# Patient Record
Sex: FEMALE | Race: OTHER | URBAN - METROPOLITAN AREA
[De-identification: names, ages, dates, MRNs, and addresses within clinical notes are randomized per-mention and may not be internally consistent; named-entity substitution may affect disease eponyms.]

---

## 2017-09-27 ENCOUNTER — INPATIENT (INPATIENT)
Facility: HOSPITAL | Age: 77
LOS: 0 days | Discharge: HOME HEALTH | End: 2017-09-28
Attending: INTERNAL MEDICINE | Admitting: INTERNAL MEDICINE
Payer: MEDICARE

## 2017-09-27 VITALS
TEMPERATURE: 98 F | HEIGHT: 67 IN | OXYGEN SATURATION: 100 % | WEIGHT: 169.98 LBS | RESPIRATION RATE: 16 BRPM | HEART RATE: 57 BPM | SYSTOLIC BLOOD PRESSURE: 132 MMHG | DIASTOLIC BLOOD PRESSURE: 74 MMHG

## 2017-09-27 DIAGNOSIS — N18.9 CHRONIC KIDNEY DISEASE, UNSPECIFIED: ICD-10-CM

## 2017-09-27 DIAGNOSIS — G45.9 TRANSIENT CEREBRAL ISCHEMIC ATTACK, UNSPECIFIED: ICD-10-CM

## 2017-09-27 DIAGNOSIS — I10 ESSENTIAL (PRIMARY) HYPERTENSION: ICD-10-CM

## 2017-09-27 DIAGNOSIS — E03.9 HYPOTHYROIDISM, UNSPECIFIED: ICD-10-CM

## 2017-09-27 DIAGNOSIS — E11.9 TYPE 2 DIABETES MELLITUS WITHOUT COMPLICATIONS: ICD-10-CM

## 2017-09-27 LAB
ALBUMIN SERPL ELPH-MCNC: 3.2 G/DL — LOW (ref 3.3–5)
ALP SERPL-CCNC: 78 U/L — SIGNIFICANT CHANGE UP (ref 40–120)
ALT FLD-CCNC: 18 U/L — SIGNIFICANT CHANGE UP (ref 12–78)
ANION GAP SERPL CALC-SCNC: 9 MMOL/L — SIGNIFICANT CHANGE UP (ref 5–17)
ANISOCYTOSIS BLD QL: SLIGHT — SIGNIFICANT CHANGE UP
APPEARANCE UR: CLEAR — SIGNIFICANT CHANGE UP
APTT BLD: 24.9 SEC — LOW (ref 27.5–37.4)
AST SERPL-CCNC: 12 U/L — LOW (ref 15–37)
BASOPHILS NFR BLD AUTO: 1 % — SIGNIFICANT CHANGE UP (ref 0–2)
BILIRUB SERPL-MCNC: 0.6 MG/DL — SIGNIFICANT CHANGE UP (ref 0.2–1.2)
BILIRUB UR-MCNC: NEGATIVE — SIGNIFICANT CHANGE UP
BLD GP AB SCN SERPL QL: SIGNIFICANT CHANGE UP
BUN SERPL-MCNC: 20 MG/DL — SIGNIFICANT CHANGE UP (ref 7–23)
CALCIUM SERPL-MCNC: 8.3 MG/DL — LOW (ref 8.5–10.1)
CHLORIDE SERPL-SCNC: 106 MMOL/L — SIGNIFICANT CHANGE UP (ref 96–108)
CK MB BLD-MCNC: 0.9 % — SIGNIFICANT CHANGE UP (ref 0–3.5)
CK MB CFR SERPL CALC: 0.7 NG/ML — SIGNIFICANT CHANGE UP (ref 0.5–3.6)
CK SERPL-CCNC: 82 U/L — SIGNIFICANT CHANGE UP (ref 26–192)
CO2 SERPL-SCNC: 23 MMOL/L — SIGNIFICANT CHANGE UP (ref 22–31)
COLOR SPEC: YELLOW — SIGNIFICANT CHANGE UP
CREAT SERPL-MCNC: 1.79 MG/DL — HIGH (ref 0.5–1.3)
DIFF PNL FLD: NEGATIVE — SIGNIFICANT CHANGE UP
EOSINOPHIL NFR BLD AUTO: 1 % — SIGNIFICANT CHANGE UP (ref 0–6)
GLUCOSE SERPL-MCNC: 153 MG/DL — HIGH (ref 70–99)
GLUCOSE UR QL: NEGATIVE MG/DL — SIGNIFICANT CHANGE UP
HCT VFR BLD CALC: 32.2 % — LOW (ref 34.5–45)
HGB BLD-MCNC: 10.8 G/DL — LOW (ref 11.5–15.5)
HYPOCHROMIA BLD QL: SLIGHT — SIGNIFICANT CHANGE UP
INR BLD: 1 RATIO — SIGNIFICANT CHANGE UP (ref 0.88–1.16)
KETONES UR-MCNC: NEGATIVE — SIGNIFICANT CHANGE UP
LEUKOCYTE ESTERASE UR-ACNC: NEGATIVE — SIGNIFICANT CHANGE UP
LYMPHOCYTES # BLD AUTO: 23 % — SIGNIFICANT CHANGE UP (ref 13–44)
MACROCYTES BLD QL: SLIGHT — SIGNIFICANT CHANGE UP
MCHC RBC-ENTMCNC: 31.1 PG — SIGNIFICANT CHANGE UP (ref 27–34)
MCHC RBC-ENTMCNC: 33.4 GM/DL — SIGNIFICANT CHANGE UP (ref 32–36)
MCV RBC AUTO: 93 FL — SIGNIFICANT CHANGE UP (ref 80–100)
MICROCYTES BLD QL: SLIGHT — SIGNIFICANT CHANGE UP
MONOCYTES NFR BLD AUTO: 6 % — SIGNIFICANT CHANGE UP (ref 2–14)
NEUTROPHILS NFR BLD AUTO: 67 % — SIGNIFICANT CHANGE UP (ref 43–77)
NITRITE UR-MCNC: NEGATIVE — SIGNIFICANT CHANGE UP
PH UR: 6.5 — SIGNIFICANT CHANGE UP (ref 5–8)
PLAT MORPH BLD: NORMAL — SIGNIFICANT CHANGE UP
PLATELET # BLD AUTO: 210 K/UL — SIGNIFICANT CHANGE UP (ref 150–400)
POLYCHROMASIA BLD QL SMEAR: SLIGHT — SIGNIFICANT CHANGE UP
POTASSIUM SERPL-MCNC: 4.6 MMOL/L — SIGNIFICANT CHANGE UP (ref 3.5–5.3)
POTASSIUM SERPL-SCNC: 4.6 MMOL/L — SIGNIFICANT CHANGE UP (ref 3.5–5.3)
PROT SERPL-MCNC: 7 GM/DL — SIGNIFICANT CHANGE UP (ref 6–8.3)
PROT UR-MCNC: NEGATIVE MG/DL — SIGNIFICANT CHANGE UP
PROTHROM AB SERPL-ACNC: 10.9 SEC — SIGNIFICANT CHANGE UP (ref 9.8–12.7)
RBC # BLD: 3.46 M/UL — LOW (ref 3.8–5.2)
RBC # FLD: 12.2 % — SIGNIFICANT CHANGE UP (ref 11–15)
RBC BLD AUTO: ABNORMAL
SODIUM SERPL-SCNC: 138 MMOL/L — SIGNIFICANT CHANGE UP (ref 135–145)
SP GR SPEC: 1.01 — SIGNIFICANT CHANGE UP (ref 1.01–1.02)
TROPONIN I SERPL-MCNC: <.015 NG/ML — SIGNIFICANT CHANGE UP (ref 0.01–0.04)
UROBILINOGEN FLD QL: NEGATIVE MG/DL — SIGNIFICANT CHANGE UP
VARIANT LYMPHS # BLD: 2 % — SIGNIFICANT CHANGE UP (ref 0–6)
WBC # BLD: 8 K/UL — SIGNIFICANT CHANGE UP (ref 3.8–10.5)
WBC # FLD AUTO: 8 K/UL — SIGNIFICANT CHANGE UP (ref 3.8–10.5)
WBC UR QL: SIGNIFICANT CHANGE UP

## 2017-09-27 PROCEDURE — 93880 EXTRACRANIAL BILAT STUDY: CPT | Mod: 26

## 2017-09-27 PROCEDURE — 99285 EMERGENCY DEPT VISIT HI MDM: CPT

## 2017-09-27 PROCEDURE — 99223 1ST HOSP IP/OBS HIGH 75: CPT

## 2017-09-27 PROCEDURE — 71010: CPT | Mod: 26

## 2017-09-27 PROCEDURE — 70450 CT HEAD/BRAIN W/O DYE: CPT | Mod: 26

## 2017-09-27 PROCEDURE — 93010 ELECTROCARDIOGRAM REPORT: CPT

## 2017-09-27 RX ORDER — ATORVASTATIN CALCIUM 80 MG/1
40 TABLET, FILM COATED ORAL AT BEDTIME
Qty: 0 | Refills: 0 | Status: DISCONTINUED | OUTPATIENT
Start: 2017-09-27 | End: 2017-09-28

## 2017-09-27 RX ORDER — DEXTROSE 50 % IN WATER 50 %
12.5 SYRINGE (ML) INTRAVENOUS ONCE
Qty: 0 | Refills: 0 | Status: DISCONTINUED | OUTPATIENT
Start: 2017-09-27 | End: 2017-09-28

## 2017-09-27 RX ORDER — ASPIRIN/CALCIUM CARB/MAGNESIUM 324 MG
325 TABLET ORAL ONCE
Qty: 0 | Refills: 0 | Status: COMPLETED | OUTPATIENT
Start: 2017-09-27 | End: 2017-09-27

## 2017-09-27 RX ORDER — DEXTROSE 50 % IN WATER 50 %
1 SYRINGE (ML) INTRAVENOUS ONCE
Qty: 0 | Refills: 0 | Status: DISCONTINUED | OUTPATIENT
Start: 2017-09-27 | End: 2017-09-28

## 2017-09-27 RX ORDER — FERROUS SULFATE 325(65) MG
325 TABLET ORAL DAILY
Qty: 0 | Refills: 0 | Status: DISCONTINUED | OUTPATIENT
Start: 2017-09-27 | End: 2017-09-28

## 2017-09-27 RX ORDER — INSULIN LISPRO 100/ML
VIAL (ML) SUBCUTANEOUS
Qty: 0 | Refills: 0 | Status: DISCONTINUED | OUTPATIENT
Start: 2017-09-27 | End: 2017-09-28

## 2017-09-27 RX ORDER — LEVOTHYROXINE SODIUM 125 MCG
100 TABLET ORAL DAILY
Qty: 0 | Refills: 0 | Status: DISCONTINUED | OUTPATIENT
Start: 2017-09-27 | End: 2017-09-28

## 2017-09-27 RX ORDER — HEPARIN SODIUM 5000 [USP'U]/ML
5000 INJECTION INTRAVENOUS; SUBCUTANEOUS EVERY 8 HOURS
Qty: 0 | Refills: 0 | Status: DISCONTINUED | OUTPATIENT
Start: 2017-09-27 | End: 2017-09-28

## 2017-09-27 RX ORDER — SODIUM CHLORIDE 9 MG/ML
1000 INJECTION, SOLUTION INTRAVENOUS
Qty: 0 | Refills: 0 | Status: DISCONTINUED | OUTPATIENT
Start: 2017-09-27 | End: 2017-09-28

## 2017-09-27 RX ORDER — ASPIRIN/CALCIUM CARB/MAGNESIUM 324 MG
325 TABLET ORAL DAILY
Qty: 0 | Refills: 0 | Status: DISCONTINUED | OUTPATIENT
Start: 2017-09-28 | End: 2017-09-28

## 2017-09-27 RX ORDER — GLUCAGON INJECTION, SOLUTION 0.5 MG/.1ML
1 INJECTION, SOLUTION SUBCUTANEOUS ONCE
Qty: 0 | Refills: 0 | Status: DISCONTINUED | OUTPATIENT
Start: 2017-09-27 | End: 2017-09-28

## 2017-09-27 RX ORDER — AMLODIPINE BESYLATE 2.5 MG/1
10 TABLET ORAL DAILY
Qty: 0 | Refills: 0 | Status: DISCONTINUED | OUTPATIENT
Start: 2017-09-27 | End: 2017-09-28

## 2017-09-27 RX ADMIN — Medication 325 MILLIGRAM(S): at 12:13

## 2017-09-27 RX ADMIN — ATORVASTATIN CALCIUM 40 MILLIGRAM(S): 80 TABLET, FILM COATED ORAL at 21:55

## 2017-09-27 RX ADMIN — HEPARIN SODIUM 5000 UNIT(S): 5000 INJECTION INTRAVENOUS; SUBCUTANEOUS at 21:54

## 2017-09-27 NOTE — H&P ADULT - HISTORY OF PRESENT ILLNESS
77yo F pmhx htn, dm(diet controlled), hypothyroid, ckd, hx of tia. Presenting after syncopal episode witnessed by hha. Pt got up to get coffee was noticed to be tremulous in the left hand, sat down and passed out in the chair. Was noted to be slurring her words, with l sided facial droop and l sided weakness. BP was noted to be 95/50. Episode occurred at 9am, last seen normal was 830am. When ems arrived patients symptoms completely resolved and she was able to walk to the ambulance.  In the ED patient with resolution of all symptoms and back to baseline as per family/patient. Denies headache, dizziness, cp, palpitations

## 2017-09-27 NOTE — CONSULT NOTE ADULT - SUBJECTIVE AND OBJECTIVE BOX
Historian:       Consult requested by ER doctor:                  Attending:     HPI:   TITO KNIGHT. 77yo F pmhx htn, dm(diet controlled), hypothyroid, ckd, hx of tia. Presenting after syncopal episode witnessed by hha. Pt got up to get coffee was noticed to be tremulous in the left hand, sat down and passed out in the chair. Was noted to be slurring her words, with l sided facial droop and l sided weakness. BP was noted to be 95/50. Episode occurred at 9am, last seen normal was 830am. When ems arrived patients symptoms completely resolved and she was able to walk to the ambulance.  In the ED patient with resolution of all symptoms and back to baseline as per family/patient. Denies headache, dizziness, cp, palpitations (27 Sep 2017 12:49)      PAST MEDICAL & SURGICAL HISTORY:  TIA (transient ischemic attack)  Diabetes  Hypertension  No significant past surgical history  76yFemale    MEDICATIONS  (STANDING):  heparin  Injectable 5000 Unit(s) SubCutaneous every 8 hours  atorvastatin 40 milliGRAM(s) Oral at bedtime  insulin lispro (HumaLOG) corrective regimen sliding scale   SubCutaneous three times a day before meals  dextrose 5%. 1000 milliLiter(s) (50 mL/Hr) IV Continuous <Continuous>  dextrose 50% Injectable 12.5 Gram(s) IV Push once  enalapril 20 milliGRAM(s) Oral daily  amLODIPine   Tablet 10 milliGRAM(s) Oral daily  levothyroxine 100 MICROGram(s) Oral daily  ferrous    sulfate 325 milliGRAM(s) Oral daily    MEDICATIONS  (PRN):  dextrose Gel 1 Dose(s) Oral once PRN Blood Glucose LESS THAN 70 milliGRAM(s)/deciliter  glucagon  Injectable 1 milliGRAM(s) IntraMuscular once PRN Glucose LESS THAN 70 milligrams/deciliter      Allergies    No Known Allergies    Intolerances      FAMILY HISTORY:  No pertinent family history in first degree relatives    Vital Signs Last 24 Hrs  T(C): 37.1 (27 Sep 2017 15:36), Max: 37.2 (27 Sep 2017 12:59)  T(F): 98.8 (27 Sep 2017 15:36), Max: 98.9 (27 Sep 2017 12:59)  HR: 60 (27 Sep 2017 15:36) (57 - 60)  BP: 147/71 (27 Sep 2017 15:36) (132/74 - 147/71)  BP(mean): --  RR: 17 (27 Sep 2017 15:36) (11 - 17)  SpO2: 100% (27 Sep 2017 15:36) (98% - 100%)    NEUROLOGICAL EXAM:  HENT:  Normocephalic head; atraumatic head.  Neck supple.  ENT: normal looking.  Mental State:    Alert.  Fully oriented to person, place and date.  Coherent.  Speech clear and intact.  Cooperative.  Responds appropriately.    Cranial Nerves:  II-XII:   Pupils round and reactive to light and accommodation.  Extraocular movements full.  Visual fields full (no homonymous hemianopsia).  Visual acuity wnl.  Facial symmetry intact.  Tongue midline.  Motor Functions:  Moves all extremities.  No pronator drift of UE.  Claps hand well.  Hand  intact bilaterally.  Ambulatory.    Sensory Functions:   Intact to touch and pinprick to face and extremities.    Reflexes:  Deep tendon reflexes normoactive to biceps, knees and ankles.  Babinski absent (present).  Cerebellar Testing:    Finger to nose intact.  Nystagmus absent.  Neurovascular: Carotid auscultation full without bruits.      LABS:                        10.8   8.0   )-----------( 210      ( 27 Sep 2017 11:24 )             32.2         138  |  106  |  20  ----------------------------<  153<H>  4.6   |  23  |  1.79<H>    Ca    8.3<L>      27 Sep 2017 11:24    TPro  7.0  /  Alb  3.2<L>  /  TBili  0.6  /  DBili  x   /  AST  12<L>  /  ALT  18  /  AlkPhos  78      PT/INR - ( 27 Sep 2017 11:24 )   PT: 10.9 sec;   INR: 1.00 ratio         PTT - ( 27 Sep 2017 11:24 )  PTT:24.9 sec    Urinalysis Basic - ( 27 Sep 2017 12:57 )    Color: Yellow / Appearance: Clear / S.010 / pH: x  Gluc: x / Ketone: Negative  / Bili: Negative / Urobili: Negative mg/dL   Blood: x / Protein: Negative mg/dL / Nitrite: Negative   Leuk Esterase: Negative / RBC: x / WBC 0-2   Sq Epi: x / Non Sq Epi: x / Bacteria: x        RADIOLOGY & ADDITIONAL STUDIES:    CT Head No Cont: Urgent   Indication: left hand arm weakness syncope 9:00 am  Transport: Stretcher-Crib  Exam Completed  Provider's Contact #: [phone] ( @ 11:00)  12 Lead ECG:   Provider's Contact #: [phone] ( @ 11:12)  Blood Glucose Point Of Care Testing:     Frequency:  Once ( @ 11:12)  Xray Chest 1 View AP/PA.: Urgent   Indication: left side weakness syncope  Transport: Stretcher-Crib  Exam Completed  Provider's Contact #: [phone] ( @ 11:12)  Complete Blood Count + Automated Diff: STAT ( @ 11:12)  Comprehensive Metabolic Panel: STAT ( @ 11:12)  Prothrombin Time and INR, Plasma:  Start Date:27-Sep-2017. STAT ( 11:12)  Activated Partial Thromboplastin Time:  Start Date:27-Sep-2017. STAT ( @ 11:12)  Type + Screen: Routine ( @ 11:12)  Creatine Kinase, Serum: STAT ( @ 11:12)  CKMB Mass Assay: STAT ( 11:12)  Troponin I, Serum: STAT ( @ 11:12)  Urinalysis + Microscopic Examination: STAT ( @ 11:12)  Culture - Urine: Routine  Specimen Source: Clean Catch (Midstream) ( @ 11:12)  Cardiac Monitor Bedside:     Time/Priority:  STAT ( @ 11:12)  aspirin:   325 milliGRAM(s), Oral, Once, Stop After 1 Doses  Provider's Contact #: [phone] ( @ 11:24)  Antibody Screen: 11:21 ( @ 11:25)  Manual Differential: 11:21 ( @ 11:30)  Admit to Inpatient Level of Care:     Service:  TELEMETRY    Physician:  Parveen Smallwood    Additional Instructions:  Diagnosis: TIA (transient ischemic attack)  Isolation: None  Special Consideration: None ( @ 12:08)  (Floorstock):   Qty Removed: 1 each ( @ 12:11)  Admit to Inpatient Level of Care:     Service:  Telemetry    Physician:  kerwin ( @ 12:15)  National Institutes of Health Stroke Scale Score:     Time/Priority:  Routine    Additional Instructions:  Type score here ___________ ( @ 12:15)  heparin  Injectable:   5000 Unit(s), SubCutaneous, every 8 hours  Provider's Contact #: [phone] ( @ 12:15)  Dysphagia Screening:     Time/Priority:  Routine ( @ 12:15)  Supervise Meals:    Indications:    Dysphagia   Additional Instructions:  Once dysphagia screen passed, contact provider to reevaluate diet order ( @ 12:15)  Assess Neurological Status:     Type of Neuro Check:  Stroke    Additional Instructions:  Perform stroke neurological check (:15)  Notify Provider For:     Additional Instructions:  Temperature GREATER THAN 38.3C (101.0F) or LESS THAN 36.0C (96.8F) ( 12:15)  Notify Provider For:     Additional Instructions:  Decline or change in neurological status (:15)  Elevate - Head of Bed 30 Degrees:     Frequency:  <Continuous> ( @ 12:15)  Activity - Out of Bed with Assistance ( 12:15)  Fall Risk Protocol:     Time/Priority:  Routine    Additional Instructions:  Follow fall risk protocol ( 12:15)  Aspiration Precautions:     Time/Priority:  Routine ( @ 12:15)  Remote Telemetry/EKG EXCEPT Off Unit Tests:     Time/Priority:  Routine ( @ 12:15)  Education:     Other: Stroke    Additional Instructions: Distribute Stroke Education material and provide stroke education ( @ 12:15)  atorvastatin: [Known as LIPITOR]  40 milliGRAM(s), Oral, at bedtime  Provider's Contact #: [phone] ( @ 12:15)  aspirin enteric coated: [Known as Ecotrin]  325 milliGRAM(s), Oral, daily  Provider's Contact #: [phone] ( @ 12:15)  Hemoglobin A1C, Whole Blood: AM Sched. Collection: 28-Sep-2017 05:00 ( @ 12:15)  Blood Glucose Point Of Care Testing:     Frequency:  Before meals and at bedtime    Additional Instructions:  Before meals and at bedtime (09-27 @ 12:15)  Blood Glucose Point Of Care Testing:     Frequency:  every 15 minutes    Additional Instructions:  After carbohydrate administration for hypoglycemia, repeat every 15 minute(s) until blood glucose is GREATER THAN or EQUAL  milliGRAM(s)/deciLiter twice consecutively ( @ 12:15)  Notify Provider For:     Additional Instructions:  Blood glucose LESS THAN 70 milliGRAM(s)/deciLiter or GREATER THAN 400 milliGRAM(s)/deciLiter ( @ 12:15)  Education:     Diabetes    Other: Diet, Exercise    Additional Instructions: Diet, Exercise, Diabetes ( @ 12:15)  insulin lispro (HumaLOG) corrective regimen sliding scale:       2 Unit(s) if Glucose 151 - 200      4 Unit(s) if Glucose 201 - 250      6 Unit(s) if Glucose 251 - 300      8 Unit(s) if Glucose 301 - 350      10 Unit(s) if Glucose 351 - 400      12 Unit(s) if Glucose Greater Than 400 + Contact MD  SubCutaneous, three times a day before meals  Special Instructions: Give correctional scale insulin REGARDLESS of PO status NOTIFY Provider for blood glucose LESS THAN 70 milliGRAM(s)/deciLiter or above 400 milliGRAM(s)/deciLiter.  Administration Instructions: *Per Sliding Scale*  Dispose unused medication in BLACK bin.  This is a Look-alike/Sound-alike Medication  Provider's Contact #: [phone] ( @ 12:15)  dextrose 5%.: Solution, 1000 milliLiter(s) infuse at 50 mL/Hr  Special Instructions: Conditional Order: HYPOGLYCEMIA PROTOCOL  Provider's Contact #: [phone] ( @ 12:15)  Administer Carbohydrates:     Additional Instructions:  HYPOGLYCEMIA PROTOCOL ( 12:15)  dextrose Gel: [Known as GLUTOSE]  1 Dose(s), Oral, once, PRN for Blood Glucose LESS THAN 70 milliGRAM(s)/deciliter, Stop After 1 Doses  Special Instructions: Conditional Order: HYPOGLYCEMIA PROTOCOL  Provider's Contact #: [phone] ( @ 12:15)  dextrose 50% Injectable:   12.5 Gram(s), IV Push, once, Stop After 1 Doses  Special Instructions: Conditional Order: HYPOGLYCEMIA PROTOCOL  Provider's Contact #: [phone] ( @ 12:15)  glucagon  Injectable:   1 milliGRAM(s), IntraMuscular, once, PRN for Glucose LESS THAN 70 milligrams/deciliter, Stop After 1 Doses  Special Instructions: Conditional Order: HYPOGLYCEMIA PROTOCOL  Provider's Contact #: [phone] ( @ 12:15)  Provider to RN:       UNRESPONSIVE patient and Blood Glucose LESS THAN 70 milliGRAM(s)/deciLiter call Rapid Response.  HYPOGLYCEMIA PROTOCOL ( @ 12:15)  Lipid Profile: AM Sched. Collection: 28-Sep-2017 05:00  Addl Info: Fasting ( @ 12:15)  Consult- PT Evaluate and Treat:   *Reason for Consult - Must select at least one choice*     Neuro Event  Weight Bearing Restrictions: No ( @ 12:15)  Consult- Social Work, Adult ( @ 12:15)  TTE Echo Doppler w/o Cont:   Transport: Stretcher-Crib  Monitor: w/o Monitor  Exam Completed  Provider's Contact #: [phone] ( @ 12:16)  US Duplex Carotid Arteries Complete, Bilateral: Routine   Indication: syncope  Transport: Stretcher-Crib  Exam Completed  Provider's Contact #: [phone] ( @ 12:16)  Admit from ED ( @ 12:20)  Thyroid Stimulating Hormone, Serum: AM Sched. Collection: 28-Sep-2017 05:00 ( @ 12:20)  Consult- Speech Bedside Swallow Evaluation:   *Reason for Consult - Must select at least one choice*     NPO Until Further Recommendations Made ( @ 12:23)  Diet, Consistent Carbohydrate/No Snacks ( @ 12:49)  enalapril: [Known as VASOTEC]  20 milliGRAM(s), Oral, daily  Provider's Contact #: [phone] ( @ 12:55)  amLODIPine   Tablet: [Known as NORVASC]  10 milliGRAM(s), Oral, daily  Administration Instructions: This is a Look-alike/Sound-alike Medication  Provider's Contact #: [phone] ( @ 12:55)  levothyroxine: [Known as SYNTHROID]  100 MICROGram(s), Oral, daily  Provider's Contact #: [phone] ( @ 12:55)  ferrous    sulfate:   325 milliGRAM(s), Oral, daily  Administration Instructions: Contains 65mG elemental iron  Provider's Contact #: [phone] ( @ 13:01)  Consult- Registered Dietitian:    Reason for Consult: Nutrition Risk Screen    do you have any questions about your prescribed diet? ( @ 16:01)      Assessment & Opinion:    Recommendations:  Brain MRI.  Carotid doppler.  Echocardiogram.  EEG.   DVT prophylaxis as ordered.  Medications: Historian:     Patient and daughter.  ER notes also reviewed.  HPI:   TITO KNIGHT. 77yo F pmhx htn, dm(diet controlled), hypothyroid, ckd, hx of tia. Presenting after syncopal episode witnessed by hha. Pt got up to get coffee was noticed to be tremulous in the left hand, sat down and passed out in the chair. Was noted to be slurring her words, with l sided facial droop and l sided weakness. BP was noted to be 95/50. Episode occurred at 9am, last seen normal was 830am. When ems arrived patients symptoms completely resolved and she was able to walk to the ambulance.  In the ED patient with resolution of all symptoms and back to baseline as per family/patient. Denies headache, dizziness, cp, palpitations (27 Sep 2017 12:49)    PAST MEDICAL & SURGICAL HISTORY:  TIA (transient ischemic attack); Diabetes; Hypertension; No significant past surgical history    MEDICATIONS  (STANDING):  heparin  Injectable 5000 Unit(s) SubCutaneous every 8 hours  atorvastatin 40 milliGRAM(s) Oral at bedtime  insulin lispro (HumaLOG) corrective regimen sliding scale   SubCutaneous three times a day before meals  dextrose 5%. 1000 milliLiter(s) (50 mL/Hr) IV Continuous <Continuous>  dextrose 50% Injectable 12.5 Gram(s) IV Push once  enalapril 20 milliGRAM(s) Oral daily  amLODIPine   Tablet 10 milliGRAM(s) Oral daily  levothyroxine 100 MICROGram(s) Oral daily  ferrous    sulfate 325 milliGRAM(s) Oral daily    MEDICATIONS  (PRN):  dextrose Gel 1 Dose(s) Oral once PRN Blood Glucose LESS THAN 70 milliGRAM(s)/deciliter  glucagon  Injectable 1 milliGRAM(s) IntraMuscular once PRN Glucose LESS THAN 70 milligrams/deciliter    Allergies: No Known Allergies  Intolerances  FAMILY HISTORY:  No pertinent family history in first degree relatives    Vital Signs Last 24 Hrs  T(C): 37.1 (27 Sep 2017 15:36), Max: 37.2 (27 Sep 2017 12:59)  T(F): 98.8 (27 Sep 2017 15:36), Max: 98.9 (27 Sep 2017 12:59)  HR: 60 (27 Sep 2017 15:36) (57 - 60)  BP: 147/71 (27 Sep 2017 15:36) (132/74 - 147/71)  BP(mean): --  RR: 17 (27 Sep 2017 15:36) (11 - 17)  SpO2: 100% (27 Sep 2017 15:36) (98% - 100%)    NEUROLOGICAL EXAM:  HENT:  Normocephalic head; atraumatic head.  Neck supple.  ENT: normal looking.  Mental State:    Alert.  Fully oriented to person, place and date.  Coherent.  Speech clear and intact.  Cooperative.  Responds appropriately.    Cranial Nerves:  II-XII:   Pupils round and reactive to light and accommodation.  Extraocular movements full.  Visual fields full (no homonymous hemianopsia).  Visual acuity wnl.  Facial symmetry intact.  Tongue midline.  Motor Functions:  Moves all extremities.  No pronator drift of UE.  Claps hands well.  Hand  intact bilaterally.    Sensory Functions:   Intact to touch and pinprick to face and extremities.    Reflexes:  Deep tendon reflexes normoactive to knees and ankles.  Cerebellar Testing:    Finger to nose intact.  Nystagmus absent.  Neurovascular: Carotid auscultation full without bruits.      LABS:                        10.8   8.0   )-----------( 210      ( 27 Sep 2017 11:24 )             32.2         138  |  106  |  20  ----------------------------<  153<H>  4.6   |  23  |  1.79<H>    Ca    8.3<L>      27 Sep 2017 11:24    TPro  7.0  /  Alb  3.2<L>  /  TBili  0.6  /  DBili  x   /  AST  12<L>  /  ALT  18  /  AlkPhos  78      PT/INR - ( 27 Sep 2017 11:24 )   PT: 10.9 sec;   INR: 1.00 ratio         PTT - ( 27 Sep 2017 11:24 )  PTT:24.9 sec    Urinalysis Basic - ( 27 Sep 2017 12:57 )    Color: Yellow / Appearance: Clear / S.010 / pH: x  Gluc: x / Ketone: Negative  / Bili: Negative / Urobili: Negative mg/dL   Blood: x / Protein: Negative mg/dL / Nitrite: Negative   Leuk Esterase: Negative / RBC: x / WBC 0-2   Sq Epi: x / Non Sq Epi: x / Bacteria: x        RADIOLOGY & ADDITIONAL STUDIES:    CT Head No Cont: Urgent   Indication: left hand arm weakness syncope 9:00 am  Transport: Stretcher-Crib  Exam Completed  Provider's Contact #: [phone] ( @ 11:00)  12 Lead ECG:   Provider's Contact #: [phone] ( @ 11:12)  Blood Glucose Point Of Care Testing:     Frequency:  Once ( @ 11:12)  Xray Chest 1 View AP/PA.: Urgent   Indication: left side weakness syncope  Transport: Stretcher-Crib  Exam Completed  Provider's Contact #: [phone] ( @ 11:12)  Complete Blood Count + Automated Diff: STAT ( @ 11:12)  Comprehensive Metabolic Panel: STAT ( 11:12)  Prothrombin Time and INR, Plasma:  Start Date:27-Sep-2017. STAT ( 11:12)  Activated Partial Thromboplastin Time:  Start Date:27-Sep-2017. STAT ( @ 11:12)  Type + Screen: Routine ( @ 11:12)  Creatine Kinase, Serum: STAT ( @ 11:12)  CKMB Mass Assay: STAT ( 11:12)  Troponin I, Serum: STAT ( @ 11:12)  Urinalysis + Microscopic Examination: STAT ( @ 11:12)  Culture - Urine: Routine  Specimen Source: Clean Catch (Midstream) ( @ 11:12)  Cardiac Monitor Bedside:     Time/Priority:  STAT ( @ 11:12)  aspirin:   325 milliGRAM(s), Oral, Once, Stop After 1 Doses  Provider's Contact #: [phone] ( @ 11:24)  Antibody Screen: 11:21 ( @ 11:25)  Manual Differential: 11:21 ( @ 11:30)  Admit to Inpatient Level of Care:     Service:  TELEMETRY    Physician:  Parveen Smallwood    Additional Instructions:  Diagnosis: TIA (transient ischemic attack)  Isolation: None  Special Consideration: None ( @ 12:08)  (Floorstock):   Qty Removed: 1 each ( @ 12:11)  Admit to Inpatient Level of Care:     Service:  Telemetry    Physician:  kerwin ( 12:15)  National Institutes of Health Stroke Scale Score:     Time/Priority:  Routine    Additional Instructions:  Type score here ___________ ( @ 12:15)  heparin  Injectable:   5000 Unit(s), SubCutaneous, every 8 hours  Provider's Contact #: [phone] ( @ 12:15)  Dysphagia Screening:     Time/Priority:  Routine ( @ 12:15)  Supervise Meals:    Indications:    Dysphagia   Additional Instructions:  Once dysphagia screen passed, contact provider to reevaluate diet order ( 12:15)  Assess Neurological Status:     Type of Neuro Check:  Stroke    Additional Instructions:  Perform stroke neurological check (:15)  Notify Provider For:     Additional Instructions:  Temperature GREATER THAN 38.3C (101.0F) or LESS THAN 36.0C (96.8F) ( @ 12:15)  Notify Provider For:     Additional Instructions:  Decline or change in neurological status (:15)  Elevate - Head of Bed 30 Degrees:     Frequency:  <Continuous> ( @ :15)  Activity - Out of Bed with Assistance (:15)  Fall Risk Protocol:     Time/Priority:  Routine    Additional Instructions:  Follow fall risk protocol (:15)  Aspiration Precautions:     Time/Priority:  Routine ( @ 12:15)  Remote Telemetry/EKG EXCEPT Off Unit Tests:     Time/Priority:  Routine ( @ 12:15)  Education:     Other: Stroke    Additional Instructions: Distribute Stroke Education material and provide stroke education (:15)  atorvastatin: [Known as LIPITOR]  40 milliGRAM(s), Oral, at bedtime  Provider's Contact #: [phone] ( @ 12:15)  aspirin enteric coated: [Known as Ecotrin]  325 milliGRAM(s), Oral, daily  Provider's Contact #: [phone] ( @ 12:15)  Hemoglobin A1C, Whole Blood: AM Sched. Collection: 28-Sep-2017 05:00 ( @ 12:15)  Blood Glucose Point Of Care Testing:     Frequency:  Before meals and at bedtime    Additional Instructions:  Before meals and at bedtime ( @ 12:15)  Blood Glucose Point Of Care Testing:     Frequency:  every 15 minutes    Additional Instructions:  After carbohydrate administration for hypoglycemia, repeat every 15 minute(s) until blood glucose is GREATER THAN or EQUAL  milliGRAM(s)/deciLiter twice consecutively ( @ 12:15)  Notify Provider For:     Additional Instructions:  Blood glucose LESS THAN 70 milliGRAM(s)/deciLiter or GREATER THAN 400 milliGRAM(s)/deciLiter ( @ 12:15)  Education:     Diabetes    Other: Diet, Exercise    Additional Instructions: Diet, Exercise, Diabetes ( @ 12:15)  insulin lispro (HumaLOG) corrective regimen sliding scale:       2 Unit(s) if Glucose 151 - 200      4 Unit(s) if Glucose 201 - 250      6 Unit(s) if Glucose 251 - 300      8 Unit(s) if Glucose 301 - 350      10 Unit(s) if Glucose 351 - 400      12 Unit(s) if Glucose Greater Than 400 + Contact MD  SubCutaneous, three times a day before meals  Special Instructions: Give correctional scale insulin REGARDLESS of PO status NOTIFY Provider for blood glucose LESS THAN 70 milliGRAM(s)/deciLiter or above 400 milliGRAM(s)/deciLiter.  Administration Instructions: *Per Sliding Scale*  Dispose unused medication in BLACK bin.  This is a Look-alike/Sound-alike Medication  Provider's Contact #: [phone] ( @ 12:15)  dextrose 5%.: Solution, 1000 milliLiter(s) infuse at 50 mL/Hr  Special Instructions: Conditional Order: HYPOGLYCEMIA PROTOCOL  Provider's Contact #: [phone] ( @ 12:15)  Administer Carbohydrates:     Additional Instructions:  HYPOGLYCEMIA PROTOCOL ( 12:15)  dextrose Gel: [Known as GLUTOSE]  1 Dose(s), Oral, once, PRN for Blood Glucose LESS THAN 70 milliGRAM(s)/deciliter, Stop After 1 Doses  Special Instructions: Conditional Order: HYPOGLYCEMIA PROTOCOL  Provider's Contact #: [phone] ( @ 12:15)  dextrose 50% Injectable:   12.5 Gram(s), IV Push, once, Stop After 1 Doses  Special Instructions: Conditional Order: HYPOGLYCEMIA PROTOCOL  Provider's Contact #: [phone] ( @ 12:15)  glucagon  Injectable:   1 milliGRAM(s), IntraMuscular, once, PRN for Glucose LESS THAN 70 milligrams/deciliter, Stop After 1 Doses  Special Instructions: Conditional Order: HYPOGLYCEMIA PROTOCOL  Provider's Contact #: [phone] ( @ 12:15)  Provider to RN:       UNRESPONSIVE patient and Blood Glucose LESS THAN 70 milliGRAM(s)/deciLiter call Rapid Response.  HYPOGLYCEMIA PROTOCOL ( @ 12:15)  Lipid Profile: AM Sched. Collection: 28-Sep-2017 05:00  Addl Info: Fasting ( @ 12:15)  Consult- PT Evaluate and Treat:   *Reason for Consult - Must select at least one choice*     Neuro Event  Weight Bearing Restrictions: No ( @ 12:15)  Consult- Social Work, Adult ( @ 12:15)  TTE Echo Doppler w/o Cont:   Transport: Stretcher-Crib  Monitor: w/o Monitor  Exam Completed  Provider's Contact #: [phone] ( @ 12:16)  US Duplex Carotid Arteries Complete, Bilateral: Routine   Indication: syncope  Transport: Stretcher-Crib  Exam Completed  Provider's Contact #: [phone] ( @ 12:16)  Admit from ED ( @ 12:20)  Thyroid Stimulating Hormone, Serum: AM Sched. Collection: 28-Sep-2017 05:00 ( @ 12:20)  Consult- Speech Bedside Swallow Evaluation:   *Reason for Consult - Must select at least one choice*     NPO Until Further Recommendations Made ( @ 12:23)  Diet, Consistent Carbohydrate/No Snacks ( @ 12:49)  enalapril: [Known as VASOTEC]  20 milliGRAM(s), Oral, daily  Provider's Contact #: [phone] ( @ 12:55)  amLODIPine   Tablet: [Known as NORVASC]  10 milliGRAM(s), Oral, daily  Administration Instructions: This is a Look-alike/Sound-alike Medication  Provider's Contact #: [phone] ( @ 12:55)  levothyroxine: [Known as SYNTHROID]  100 MICROGram(s), Oral, daily  Provider's Contact #: [phone] ( @ 12:55)  ferrous    sulfate:   325 milliGRAM(s), Oral, daily  Administration Instructions: Contains 65mG elemental iron  Provider's Contact #: [phone] ( @ 13:01)  Consult- Registered Dietitian:    Reason for Consult: Nutrition Risk Screen    do you have any questions about your prescribed diet? ( @ 16:01)    Assessment & Opinion:  Syncopal Episode, probably vasovagal  Recommendations:  Brain MRI.  Carotid doppler.  Echocardiogram.  EEG.   DVT prophylaxis as ordered.  Medications:  Continue current medications

## 2017-09-27 NOTE — ED PROVIDER NOTE - OBJECTIVE STATEMENT
76 years old female by ems with her daughter c/o left arm and hand weakness could not hold the cup while taking her medicine than past out in the chairat 9:00 am last seen fine by aide 8:30 am. Now pt is alert and oriented x 3 denies headache, dizziness, blurred visions, light sensitivities, cough, sob, chest pain, neck/back/hips pain, nausea, vomiting, fever, chills, abd pain, dysuria, or irregular bowel movements.

## 2017-09-27 NOTE — ED ADULT NURSE NOTE - OBJECTIVE STATEMENT
pt c/o hands trembling, dizziness, syncope, facial droop. pt able to ambulate by self when emt came, pt denies dizziness, cp  at this time.

## 2017-09-27 NOTE — H&P ADULT - ASSESSMENT
75yo F pmhx htn, dm(diet controlled), hypothyroid, ckd, hx of tia. Presenting after syncopal episode witnessed by hha. Pt got up to get coffee was noticed to be tremulous in the left hand, sat down and passed out in the chair. Was noted to be slurring her words, with l sided facial droop and l sided weakness. BP was noted to be 95/50. Episode occurred at 9am, last seen normal was 830am. When ems arrived patients symptoms completely resolved and she was able to walk to the ambulance.  In the ED patient with resolution of all symptoms and back to baseline as per family/patient.

## 2017-09-27 NOTE — H&P ADULT - NSHPLABSRESULTS_GEN_ALL_CORE
10.8   8.0   )-----------( 210      ( 27 Sep 2017 11:24 )             32.2     09-27    138  |  106  |  20  ----------------------------<  153<H>  4.6   |  23  |  1.79<H>    Ca    8.3<L>      27 Sep 2017 11:24    TPro  7.0  /  Alb  3.2<L>  /  TBili  0.6  /  DBili  x   /  AST  12<L>  /  ALT  18  /  AlkPhos  78  09-27    PT/INR - ( 27 Sep 2017 11:24 )   PT: 10.9 sec;   INR: 1.00 ratio         PTT - ( 27 Sep 2017 11:24 )  PTT:24.9 sec    < from: CT Head No Cont (09.27.17 @ 11:14) >      IMPRESSION:    Involutional and ischemic gliotic changes.  No acute intracranial hemorrhage.    Findings were discussed with Dr. Aguilar by telephone on 9/27/2017 at 1110   hours.                VERONICA COLLAZO M.D., ATTENDING RADIOLOGIST  This document has been electronically signed. Sep 27 2017 11:15AM                < end of copied text >

## 2017-09-27 NOTE — ED PROVIDER NOTE - PROGRESS NOTE DETAILS
Pt return from the ct of head, Pt is alert and oriented x 3 smiling NIH score is zero again abd is soft nontender to palp at all quadrants. Pt remain alert and oriented x 3 NIH score is zero pt is not a tpa candidate. Dr. Estrella is notified and admit pt.

## 2017-09-27 NOTE — ED PROVIDER NOTE - CONSTITUTIONAL, MLM
normal... Well appearing, well nourished, awake, alert, oriented to person, place, time/situation and in no apparent distress. Speaking in clear full sentences smiling appears very comfortable siting up in the stretcher in a bright hallway

## 2017-09-27 NOTE — ED ADULT TRIAGE NOTE - CHIEF COMPLAINT QUOTE
question stroke facial droop and weakness( resolved) vs syncope Dr christopher evaluated code stroke called onset 9am

## 2017-09-27 NOTE — ED PROVIDER NOTE - MUSCULOSKELETAL, MLM
Spine appears normal, range of motion is not limited, no muscle or joint tenderness no midline tenderness to palp cervical. thoracic, and lumbar spines

## 2017-09-27 NOTE — H&P ADULT - PROBLEM SELECTOR PLAN 1
admit to tele, ct head negative  neuro consult  echo and carotid ordered  asa/statin  pt/ot  dvt ppx

## 2017-09-27 NOTE — H&P ADULT - NSHPPHYSICALEXAM_GEN_ALL_CORE
GENERAL: NAD, well-groomed, well-developed  HEAD:  Atraumatic, Normocephalic  EYES: EOMI, PERRLA, conjunctiva and sclera clear  ENMT: No tonsillar erythema, exudates, or enlargement; Moist mucous membranes, Good dentition, No lesions  NECK: Supple, No JVD, Normal thyroid  NERVOUS SYSTEM:  Alert & Oriented X3, Good concentration; Motor Strength 5/5 B/L upper and lower extremities; DTRs 2+ intact and symmetric  CHEST/LUNG: Clear to percussion bilaterally; No rales, rhonchi, wheezing, or rubs  HEART: Regular rate and rhythm; No murmurs, rubs, or gallops  ABDOMEN: Soft, Nontender, Nondistended; Bowel sounds present  EXTREMITIES:  2+ Peripheral Pulses, No clubbing, cyanosis, or edema  LYMPH: No lymphadenopathy   SKIN: No rashes or lesions

## 2017-09-28 VITALS
HEART RATE: 73 BPM | RESPIRATION RATE: 18 BRPM | SYSTOLIC BLOOD PRESSURE: 154 MMHG | DIASTOLIC BLOOD PRESSURE: 79 MMHG | OXYGEN SATURATION: 98 %

## 2017-09-28 LAB
CHOLEST SERPL-MCNC: 146 MG/DL — SIGNIFICANT CHANGE UP (ref 10–199)
CULTURE RESULTS: SIGNIFICANT CHANGE UP
HBA1C BLD-MCNC: 5.7 % — HIGH (ref 4–5.6)
HDLC SERPL-MCNC: 47 MG/DL — SIGNIFICANT CHANGE UP (ref 40–125)
LIPID PNL WITH DIRECT LDL SERPL: 80 MG/DL — SIGNIFICANT CHANGE UP
SPECIMEN SOURCE: SIGNIFICANT CHANGE UP
TOTAL CHOLESTEROL/HDL RATIO MEASUREMENT: 3.1 RATIO — LOW (ref 3.3–7.1)
TRIGL SERPL-MCNC: 96 MG/DL — SIGNIFICANT CHANGE UP (ref 10–149)
TSH SERPL-MCNC: 9.31 UU/ML — HIGH (ref 0.36–3.74)

## 2017-09-28 PROCEDURE — 99239 HOSP IP/OBS DSCHRG MGMT >30: CPT

## 2017-09-28 PROCEDURE — 70551 MRI BRAIN STEM W/O DYE: CPT | Mod: 26

## 2017-09-28 RX ADMIN — HEPARIN SODIUM 5000 UNIT(S): 5000 INJECTION INTRAVENOUS; SUBCUTANEOUS at 14:05

## 2017-09-28 RX ADMIN — Medication 325 MILLIGRAM(S): at 11:45

## 2017-09-28 RX ADMIN — HEPARIN SODIUM 5000 UNIT(S): 5000 INJECTION INTRAVENOUS; SUBCUTANEOUS at 05:43

## 2017-09-28 RX ADMIN — Medication 100 MICROGRAM(S): at 05:43

## 2017-09-28 RX ADMIN — AMLODIPINE BESYLATE 10 MILLIGRAM(S): 2.5 TABLET ORAL at 05:43

## 2017-09-28 RX ADMIN — Medication 20 MILLIGRAM(S): at 05:43

## 2017-09-28 NOTE — DISCHARGE NOTE ADULT - MEDICATION SUMMARY - MEDICATIONS TO TAKE
I will START or STAY ON the medications listed below when I get home from the hospital:    Aspir 81 oral delayed release tablet  -- 1 tab(s) by mouth once a day  -- Indication: For preventive    enalapril 20 mg oral tablet  -- 1 tab(s) by mouth once a day  -- Indication: For Htn    doxazosin 2 mg oral tablet  -- 1 tab(s) by mouth once a day  -- Indication: For Hypertension    gabapentin 300 mg oral capsule  -- 1 cap(s) by mouth 3 times a day  -- Indication: For pain    simvastatin 40 mg oral tablet  -- 1 tab(s) by mouth once a day (at bedtime)  -- Indication: For Hld    Norvasc 10 mg oral tablet  -- 1 tab(s) by mouth once a day  -- Indication: For Hypertension    furosemide 40 mg oral tablet  -- 1 tab(s) by mouth once a day  -- Indication: For Hypertension    Synthroid 100 mcg (0.1 mg) oral tablet  -- 1 tab(s) by mouth once a day  -- Indication: For Hypothyroid    hydrALAZINE 50 mg oral tablet  -- 1 tab(s) by mouth 4 times a day  -- Indication: For Hypertension

## 2017-09-28 NOTE — DISCHARGE NOTE ADULT - NS AS DC STROKE ED MATERIALS
Need for Followup After Discharge/Stroke Education Booklet/Stroke Warning Signs and Symptoms/Risk Factors for Stroke/Prescribed Medications/Call 911 for Stroke

## 2017-09-28 NOTE — DISCHARGE NOTE ADULT - SECONDARY DIAGNOSIS.
Acquired hypothyroidism Type 2 diabetes mellitus without complication, without long-term current use of insulin Essential hypertension Chronic kidney disease, unspecified CKD stage

## 2017-09-28 NOTE — DIETITIAN INITIAL EVALUATION ADULT. - NS AS NUTRI INTERV ED CONTENT
Purpose of the nutrition education/Educate pt on meal planning c plate method , regular mealtimes , consistent CHO intake

## 2017-09-28 NOTE — DISCHARGE NOTE ADULT - PATIENT PORTAL LINK FT
“You can access the FollowHealth Patient Portal, offered by [facility], by registering with the following website: [email or web address]”

## 2017-09-28 NOTE — DIETITIAN INITIAL EVALUATION ADULT. - NS AS NUTRI INTERV MEALS SNACK
[place] - modified diet/Carbohydrate - modified diet/Recommend Low sodium, consistent carbohydrate c evening snack

## 2017-09-28 NOTE — SWALLOW BEDSIDE ASSESSMENT ADULT - COMMENTS
MRI head 9/28/2017 IMPRESSION:No acute intracranial hemorrhage or acute infarct.    CXR 9/27/2017IMPRESSION: No lung consolidations.

## 2017-09-28 NOTE — DIETITIAN INITIAL EVALUATION ADULT. - ADHERENCE
Low sodium, diabetic, no sugar, no concentrated sweets, pt had HHA who did some shopping & cooking, family also assisted c shopping & cooking/good

## 2017-09-28 NOTE — SWALLOW BEDSIDE ASSESSMENT ADULT - SLP GENERAL OBSERVATIONS
hannah 5  pt seen sitting in the chair having breakfast, alert and oriented x4. pt responded to questions, verbalized wants and she was able to follow directions. noted good speech intelligibility and hoarse vocal quality

## 2017-09-28 NOTE — DIETITIAN INITIAL EVALUATION ADULT. - PERTINENT LABORATORY DATA
09-28 Thyroid stimulating Hormone 9.310 uU/ml <H> 09-27 Na138 mmol/L Glu 153 mg/dL<H> K+ 4.6 mmol/L Cr  1.79 mg/dL<H> BUN 20 mg/dL Est GFR 27 mL/min/1.73M2<L> Phos n/a   Ca 8.3 mg/dL<L> Alb 3.2 g/dL<L>  Hgb 10.8 g/dL<L> Hct 32.2 %<L> 09-28 Thyroid stimulating Hormone 9.310 uU/ml <H> HbA1/GC 5.7 %<@ goal of HbA1/GC less than7.0 %>  09-27 Na138 mmol/L Glu 153 mg/dL<H> K+ 4.6 mmol/L Cr  1.79 mg/dL<H> BUN 20 mg/dL Est GFR 27 mL/min/1.73M2<L> Phos n/a   Ca 8.3 mg/dL<L> Alb 3.2 g/dL<L>  Hgb 10.8 g/dL<L> Hct 32.2 %<L> 09-28 Thyroid stimulating Hormone 9.310 uU/ml <H> HbA1/GC 5.7 %<@ goal of HbA1/GC less than7.0 %> CHOL 146 mg/dL  09-27 Na138 mmol/L Glu 153 mg/dL<H> K+ 4.6 mmol/L Cr  1.79 mg/dL<H> BUN 20 mg/dL Est GFR 27 mL/min/1.73M2<L> Phos n/a   Ca 8.3 mg/dL<L> Alb 3.2 g/dL<L>  Hgb 10.8 g/dL<L> Hct 32.2 %<L>

## 2017-09-28 NOTE — DISCHARGE NOTE ADULT - CARE PLAN
Principal Discharge DX:	TIA (transient ischemic attack)  Goal:	f/u with neuro  Instructions for follow-up, activity and diet:	f/u neuro/pcp, activity as tolerated, diabetic diet  Secondary Diagnosis:	Acquired hypothyroidism  Secondary Diagnosis:	Type 2 diabetes mellitus without complication, without long-term current use of insulin  Secondary Diagnosis:	Essential hypertension  Secondary Diagnosis:	Chronic kidney disease, unspecified CKD stage

## 2017-09-28 NOTE — DIETITIAN INITIAL EVALUATION ADULT. - PERTINENT MEDS FT
ferrous sulfate , amlodipine , enalapril , levothyroxine , atorvastatin , lispro corrective regimen sliding scale 3 x day before meals

## 2017-09-28 NOTE — DIETITIAN INITIAL EVALUATION ADULT. - OTHER INFO
Pt seen for RN consult for do you have any questions about your prescribed diet. Pt seen for RN consult for do you have any questions about your prescribed diet.  Diet hx reveals pt to consume 2 eggs, toast, juice for breakfast, skips lunch or has fruits, and fish , starch & vegetables for dinner.  Pt reports that she used to be on insulin, diabetic pills & was taken off them.  Pt was not self monitoring blood glucose PTA.  Pt c good appetite at present, w/o complaints.  Pt was unaware of wt. loss PTA, but stated that she may have lost weight as she has cut back on food intake.   As per adm wt., pt is 5 kg/ 11LBS less than stated usual wt.

## 2017-09-28 NOTE — PHYSICAL THERAPY INITIAL EVALUATION ADULT - ADDITIONAL COMMENTS
Pt has home health aide services 5 days x 6 hours a day, pt uses a cane mostly but also has a rolling walker. Pt is a community ambulator.

## 2017-09-28 NOTE — DISCHARGE NOTE ADULT - CARE PROVIDER_API CALL
Rah Monsivais (MD), Neurology  [address] [place] [address]  Phone: [phone]  Fax: [phone]    [facility],   Phone: (   )    -  Fax: (   )    -

## 2017-09-28 NOTE — DISCHARGE NOTE ADULT - HOSPITAL COURSE
75yo F pmhx htn, dm(diet controlled), hypothyroid, ckd, hx of tia. Presenting after syncopal episode witnessed by hha. Pt got up to get coffee was noticed to be tremulous in the left hand, sat down and passed out in the chair. Was noted to be slurring her words, with l sided facial droop and l sided weakness. BP was noted to be 95/50. Episode occurred at 9am, last seen normal was 830am. When ems arrived patients symptoms completely resolved and she was able to walk to the ambulance.  In the ED patient with resolution of all symptoms and back to baseline as per family/patient.      Problem/Plan - 1:  ·  Problem: Transient cerebral ischemia, unspecified type.  Plan: work up negative, c/w asa, statin, f/u with neuro outpt   Problem/Plan - 2:  ·  Problem: Type 2 diabetes mellitus without complication, without long-term current use of insulin.  Plan: diet control   Problem/Plan - 3:  ·  Problem: Hypertension.  Plan: c/w home meds     Problem/Plan - 4:  ·  Problem: Hypothyroid.  Plan: c/w synthroid     Problem/Plan - 5:  ·  Problem: Chronic kidney disease, unspecified CKD stage.  Plan: pcp       Attending Statement:  65 minutes spent on total discharge encounter; more than 50% of the visit was spent counseling and/or coordinating care by the attending physician.

## 2017-09-28 NOTE — DIETITIAN INITIAL EVALUATION ADULT. - SOURCE
patient/other (specify)/chart review, pt is Swedish speaking, speaks also English, declined cyracom phone for interpretation, preferred to speak in English

## 2017-09-28 NOTE — PHYSICAL THERAPY INITIAL EVALUATION ADULT - MODIFIED CLINICAL TEST OF SENSORY INTEGRATION IN BALANCE TEST
Barthel Index: Feeding Score _10__, Bathing Score __5_, Grooming Score _5__, Dressing Score _10__, Bowels Score _10__, Bladder Score _10__, Toilet Score _10__, Transfers Score _10__, Mobility Score _10__, Stairs Score _0__,     Total Score _80__

## 2017-09-28 NOTE — DIETITIAN INITIAL EVALUATION ADULT. - ENERGY NEEDS
Height (cm): 170.18 (09-27)  Weight (kg): 77.1 (09-27)  BMI (kg/m2): 26.6 (09-27)  IBW: 61.2 kg     % IBW:   125%          UBW:              %UBW Height (cm): 162.56 (09-28)  Weight (kg): 77.1 (09-27)  BMI (kg/m2): 29.2 (09-28)  IBW:          % IBW:             UBW:              %UBW Height (cm): 162.56 (09-28)  Weight (kg): 77.1 (09-27)  BMI (kg/m2): 29.2 (09-28)  IBW: 54.4 kg     % IBW: 141%  UBW: 82.5 kg   %UBW: 93%

## 2017-10-02 DIAGNOSIS — N18.9 CHRONIC KIDNEY DISEASE, UNSPECIFIED: ICD-10-CM

## 2017-10-02 DIAGNOSIS — G45.9 TRANSIENT CEREBRAL ISCHEMIC ATTACK, UNSPECIFIED: ICD-10-CM

## 2017-10-02 DIAGNOSIS — E03.9 HYPOTHYROIDISM, UNSPECIFIED: ICD-10-CM

## 2017-10-02 DIAGNOSIS — E11.9 TYPE 2 DIABETES MELLITUS WITHOUT COMPLICATIONS: ICD-10-CM

## 2017-10-02 DIAGNOSIS — Z86.73 PERSONAL HISTORY OF TRANSIENT ISCHEMIC ATTACK (TIA), AND CEREBRAL INFARCTION WITHOUT RESIDUAL DEFICITS: ICD-10-CM

## 2017-10-02 DIAGNOSIS — I12.9 HYPERTENSIVE CHRONIC KIDNEY DISEASE WITH STAGE 1 THROUGH STAGE 4 CHRONIC KIDNEY DISEASE, OR UNSPECIFIED CHRONIC KIDNEY DISEASE: ICD-10-CM

## 2019-09-24 NOTE — ED ADULT TRIAGE NOTE - WEIGHT IN LBS
169.9
Detail Level: Detailed
Quality 110: Preventive Care And Screening: Influenza Immunization: Influenza Immunization Administered during Influenza season

## 2022-07-14 ENCOUNTER — EMERGENCY (EMERGENCY)
Facility: HOSPITAL | Age: 82
LOS: 0 days | Discharge: HOME | End: 2022-07-14

## 2022-07-14 VITALS
RESPIRATION RATE: 13 BRPM | SYSTOLIC BLOOD PRESSURE: 217 MMHG | OXYGEN SATURATION: 98 % | DIASTOLIC BLOOD PRESSURE: 111 MMHG | HEART RATE: 70 BPM

## 2022-07-14 VITALS
RESPIRATION RATE: 17 BRPM | WEIGHT: 175.93 LBS | DIASTOLIC BLOOD PRESSURE: 118 MMHG | TEMPERATURE: 99 F | SYSTOLIC BLOOD PRESSURE: 216 MMHG | HEIGHT: 64 IN | HEART RATE: 72 BPM | OXYGEN SATURATION: 98 %

## 2022-07-14 DIAGNOSIS — I10 ESSENTIAL (PRIMARY) HYPERTENSION: ICD-10-CM

## 2022-07-14 DIAGNOSIS — M54.50 LOW BACK PAIN, UNSPECIFIED: ICD-10-CM

## 2022-07-14 DIAGNOSIS — Z79.82 LONG TERM (CURRENT) USE OF ASPIRIN: ICD-10-CM

## 2022-07-14 DIAGNOSIS — R10.12 LEFT UPPER QUADRANT PAIN: ICD-10-CM

## 2022-07-14 DIAGNOSIS — K57.32 DIVERTICULITIS OF LARGE INTESTINE WITHOUT PERFORATION OR ABSCESS WITHOUT BLEEDING: ICD-10-CM

## 2022-07-14 PROBLEM — G45.9 TRANSIENT CEREBRAL ISCHEMIC ATTACK, UNSPECIFIED: Chronic | Status: ACTIVE | Noted: 2017-09-27

## 2022-07-14 PROBLEM — E11.9 TYPE 2 DIABETES MELLITUS WITHOUT COMPLICATIONS: Chronic | Status: ACTIVE | Noted: 2017-09-27

## 2022-07-14 LAB
ANION GAP SERPL CALC-SCNC: 9 MMOL/L — SIGNIFICANT CHANGE UP (ref 5–17)
APPEARANCE UR: CLEAR — SIGNIFICANT CHANGE UP
BACTERIA # UR AUTO: ABNORMAL
BASOPHILS # BLD AUTO: 0.04 K/UL — SIGNIFICANT CHANGE UP (ref 0–0.2)
BASOPHILS NFR BLD AUTO: 0.8 % — SIGNIFICANT CHANGE UP (ref 0–2)
BILIRUB UR-MCNC: NEGATIVE — SIGNIFICANT CHANGE UP
BUN SERPL-MCNC: 13 MG/DL — SIGNIFICANT CHANGE UP (ref 7–23)
CALCIUM SERPL-MCNC: 9.4 MG/DL — SIGNIFICANT CHANGE UP (ref 8.5–10.1)
CHLORIDE SERPL-SCNC: 100 MMOL/L — SIGNIFICANT CHANGE UP (ref 96–108)
CO2 SERPL-SCNC: 25 MMOL/L — SIGNIFICANT CHANGE UP (ref 22–31)
COD CRY URNS QL: ABNORMAL
COLOR SPEC: YELLOW — SIGNIFICANT CHANGE UP
CREAT SERPL-MCNC: 1.57 MG/DL — HIGH (ref 0.5–1.3)
DIFF PNL FLD: ABNORMAL
EGFR: 33 ML/MIN/1.73M2 — LOW
EOSINOPHIL # BLD AUTO: 0.09 K/UL — SIGNIFICANT CHANGE UP (ref 0–0.5)
EOSINOPHIL NFR BLD AUTO: 1.9 % — SIGNIFICANT CHANGE UP (ref 0–6)
GLUCOSE SERPL-MCNC: 105 MG/DL — HIGH (ref 70–99)
GLUCOSE UR QL: NEGATIVE MG/DL — SIGNIFICANT CHANGE UP
HCT VFR BLD CALC: 37.7 % — SIGNIFICANT CHANGE UP (ref 34.5–45)
HGB BLD-MCNC: 12.7 G/DL — SIGNIFICANT CHANGE UP (ref 11.5–15.5)
IMM GRANULOCYTES NFR BLD AUTO: 0.2 % — SIGNIFICANT CHANGE UP (ref 0–1.5)
KETONES UR-MCNC: NEGATIVE — SIGNIFICANT CHANGE UP
LEUKOCYTE ESTERASE UR-ACNC: NEGATIVE — SIGNIFICANT CHANGE UP
LYMPHOCYTES # BLD AUTO: 1.52 K/UL — SIGNIFICANT CHANGE UP (ref 1–3.3)
LYMPHOCYTES # BLD AUTO: 32 % — SIGNIFICANT CHANGE UP (ref 13–44)
MCHC RBC-ENTMCNC: 30.6 PG — SIGNIFICANT CHANGE UP (ref 27–34)
MCHC RBC-ENTMCNC: 33.7 G/DL — SIGNIFICANT CHANGE UP (ref 32–36)
MCV RBC AUTO: 90.8 FL — SIGNIFICANT CHANGE UP (ref 80–100)
MONOCYTES # BLD AUTO: 0.41 K/UL — SIGNIFICANT CHANGE UP (ref 0–0.9)
MONOCYTES NFR BLD AUTO: 8.6 % — SIGNIFICANT CHANGE UP (ref 2–14)
NEUTROPHILS # BLD AUTO: 2.68 K/UL — SIGNIFICANT CHANGE UP (ref 1.8–7.4)
NEUTROPHILS NFR BLD AUTO: 56.5 % — SIGNIFICANT CHANGE UP (ref 43–77)
NITRITE UR-MCNC: NEGATIVE — SIGNIFICANT CHANGE UP
NRBC # BLD: 0 /100 WBCS — SIGNIFICANT CHANGE UP (ref 0–0)
PH UR: 6 — SIGNIFICANT CHANGE UP (ref 5–8)
PLATELET # BLD AUTO: 207 K/UL — SIGNIFICANT CHANGE UP (ref 150–400)
POTASSIUM SERPL-MCNC: 4.4 MMOL/L — SIGNIFICANT CHANGE UP (ref 3.5–5.3)
POTASSIUM SERPL-SCNC: 4.4 MMOL/L — SIGNIFICANT CHANGE UP (ref 3.5–5.3)
PROT UR-MCNC: 500 MG/DL
RBC # BLD: 4.15 M/UL — SIGNIFICANT CHANGE UP (ref 3.8–5.2)
RBC # FLD: 13.2 % — SIGNIFICANT CHANGE UP (ref 10.3–14.5)
RBC CASTS # UR COMP ASSIST: SIGNIFICANT CHANGE UP /HPF (ref 0–4)
SODIUM SERPL-SCNC: 134 MMOL/L — LOW (ref 135–145)
SP GR SPEC: 1.01 — SIGNIFICANT CHANGE UP (ref 1.01–1.02)
UROBILINOGEN FLD QL: NEGATIVE MG/DL — SIGNIFICANT CHANGE UP
WBC # BLD: 4.87 K/UL — SIGNIFICANT CHANGE UP (ref 3.8–10.5)
WBC # FLD AUTO: 4.87 K/UL — SIGNIFICANT CHANGE UP (ref 3.8–10.5)
WBC UR QL: SIGNIFICANT CHANGE UP

## 2022-07-14 PROCEDURE — 74176 CT ABD & PELVIS W/O CONTRAST: CPT | Mod: 26,MA

## 2022-07-14 PROCEDURE — 99285 EMERGENCY DEPT VISIT HI MDM: CPT

## 2022-07-14 RX ORDER — EZETIMIBE 10 MG/1
1 TABLET ORAL
Qty: 0 | Refills: 0 | DISCHARGE

## 2022-07-14 RX ORDER — HYDRALAZINE HCL 50 MG
1 TABLET ORAL
Qty: 0 | Refills: 0 | DISCHARGE

## 2022-07-14 RX ORDER — LOSARTAN POTASSIUM 100 MG/1
1 TABLET, FILM COATED ORAL
Qty: 0 | Refills: 0 | DISCHARGE

## 2022-07-14 RX ORDER — LABETALOL HCL 100 MG
100 TABLET ORAL ONCE
Refills: 0 | Status: COMPLETED | OUTPATIENT
Start: 2022-07-14 | End: 2022-07-14

## 2022-07-14 RX ORDER — METRONIDAZOLE 500 MG
1 TABLET ORAL
Qty: 21 | Refills: 0
Start: 2022-07-14 | End: 2022-07-21

## 2022-07-14 RX ORDER — GABAPENTIN 400 MG/1
1 CAPSULE ORAL
Qty: 0 | Refills: 0 | DISCHARGE

## 2022-07-14 RX ORDER — PANTOPRAZOLE SODIUM 20 MG/1
1 TABLET, DELAYED RELEASE ORAL
Qty: 0 | Refills: 0 | DISCHARGE

## 2022-07-14 RX ORDER — GABAPENTIN 400 MG/1
1 CAPSULE ORAL
Qty: 21 | Refills: 0
Start: 2022-07-14 | End: 2022-07-20

## 2022-07-14 RX ORDER — METRONIDAZOLE 500 MG
1 TABLET ORAL
Qty: 21 | Refills: 0
Start: 2022-07-14 | End: 2022-07-20

## 2022-07-14 RX ORDER — METRONIDAZOLE 500 MG
500 TABLET ORAL ONCE
Refills: 0 | Status: COMPLETED | OUTPATIENT
Start: 2022-07-14 | End: 2022-07-14

## 2022-07-14 RX ORDER — LABETALOL HCL 100 MG
0 TABLET ORAL
Qty: 0 | Refills: 0 | DISCHARGE

## 2022-07-14 RX ORDER — OXYCODONE AND ACETAMINOPHEN 5; 325 MG/1; MG/1
1 TABLET ORAL ONCE
Refills: 0 | Status: DISCONTINUED | OUTPATIENT
Start: 2022-07-14 | End: 2022-07-14

## 2022-07-14 RX ORDER — FUROSEMIDE 40 MG
1 TABLET ORAL
Qty: 0 | Refills: 0 | DISCHARGE

## 2022-07-14 RX ORDER — LOSARTAN POTASSIUM 100 MG/1
100 TABLET, FILM COATED ORAL DAILY
Refills: 0 | Status: DISCONTINUED | OUTPATIENT
Start: 2022-07-14 | End: 2022-07-14

## 2022-07-14 RX ORDER — LIDOCAINE 4 G/100G
1 CREAM TOPICAL ONCE
Refills: 0 | Status: COMPLETED | OUTPATIENT
Start: 2022-07-14 | End: 2022-07-14

## 2022-07-14 RX ORDER — ACETAMINOPHEN 500 MG
2 TABLET ORAL
Qty: 168 | Refills: 0
Start: 2022-07-14 | End: 2022-07-27

## 2022-07-14 RX ORDER — CEFPODOXIME PROXETIL 100 MG
1 TABLET ORAL
Qty: 14 | Refills: 0
Start: 2022-07-14 | End: 2022-07-20

## 2022-07-14 RX ORDER — LEVOTHYROXINE SODIUM 125 MCG
1 TABLET ORAL
Qty: 0 | Refills: 0 | DISCHARGE

## 2022-07-14 RX ORDER — SACCHAROMYCES BOULARDII 250 MG
1 POWDER IN PACKET (EA) ORAL
Qty: 14 | Refills: 0
Start: 2022-07-14 | End: 2022-07-21

## 2022-07-14 RX ORDER — ROSUVASTATIN CALCIUM 5 MG/1
1 TABLET ORAL
Qty: 0 | Refills: 0 | DISCHARGE

## 2022-07-14 RX ORDER — LABETALOL HCL 100 MG
200 TABLET ORAL ONCE
Refills: 0 | Status: COMPLETED | OUTPATIENT
Start: 2022-07-14 | End: 2022-07-14

## 2022-07-14 RX ORDER — CEFPODOXIME PROXETIL 100 MG
1 TABLET ORAL
Qty: 14 | Refills: 0
Start: 2022-07-14 | End: 2022-07-21

## 2022-07-14 RX ORDER — ACETAMINOPHEN 500 MG
2 TABLET ORAL
Qty: 168 | Refills: 0
Start: 2022-07-14 | End: 2022-07-28

## 2022-07-14 RX ORDER — AMLODIPINE BESYLATE 2.5 MG/1
1 TABLET ORAL
Qty: 0 | Refills: 0 | DISCHARGE

## 2022-07-14 RX ORDER — SACCHAROMYCES BOULARDII 250 MG
1 POWDER IN PACKET (EA) ORAL
Qty: 14 | Refills: 0
Start: 2022-07-14 | End: 2022-07-20

## 2022-07-14 RX ORDER — CEFPODOXIME PROXETIL 100 MG
200 TABLET ORAL ONCE
Refills: 0 | Status: COMPLETED | OUTPATIENT
Start: 2022-07-14 | End: 2022-07-14

## 2022-07-14 RX ORDER — SIMVASTATIN 20 MG/1
1 TABLET, FILM COATED ORAL
Qty: 0 | Refills: 0 | DISCHARGE

## 2022-07-14 RX ORDER — DOXAZOSIN MESYLATE 4 MG
1 TABLET ORAL
Qty: 0 | Refills: 0 | DISCHARGE

## 2022-07-14 RX ORDER — ASPIRIN/CALCIUM CARB/MAGNESIUM 324 MG
1 TABLET ORAL
Qty: 0 | Refills: 0 | DISCHARGE

## 2022-07-14 RX ORDER — GABAPENTIN 400 MG/1
1 CAPSULE ORAL
Qty: 21 | Refills: 0
Start: 2022-07-14 | End: 2022-07-21

## 2022-07-14 RX ORDER — CALCITRIOL 0.5 UG/1
1 CAPSULE ORAL
Qty: 0 | Refills: 0 | DISCHARGE

## 2022-07-14 RX ADMIN — OXYCODONE AND ACETAMINOPHEN 1 TABLET(S): 5; 325 TABLET ORAL at 12:28

## 2022-07-14 RX ADMIN — Medication 500 MILLIGRAM(S): at 16:14

## 2022-07-14 RX ADMIN — Medication 100 MILLIGRAM(S): at 13:48

## 2022-07-14 RX ADMIN — Medication 200 MILLIGRAM(S): at 16:14

## 2022-07-14 RX ADMIN — Medication 200 MILLIGRAM(S): at 16:13

## 2022-07-14 RX ADMIN — LIDOCAINE 1 PATCH: 4 CREAM TOPICAL at 12:29

## 2022-07-14 RX ADMIN — OXYCODONE AND ACETAMINOPHEN 1 TABLET(S): 5; 325 TABLET ORAL at 16:00

## 2022-07-14 RX ADMIN — LOSARTAN POTASSIUM 100 MILLIGRAM(S): 100 TABLET, FILM COATED ORAL at 16:14

## 2022-07-14 NOTE — ED ADULT NURSE NOTE - CHIEF COMPLAINT QUOTE
Patient c/o right lower back pain x 2 week that has gotten progressively worse. B/p elevated in triage, didn't take any meds this morning 216/118  hx renal disease, htn

## 2022-07-14 NOTE — ED PROVIDER NOTE - CLINICAL SUMMARY MEDICAL DECISION MAKING FREE TEXT BOX
pt pw gradual onset of R lower back pain aching mod in severity with flank pain, ttp over the perilumbar region on exam. Pending CTAP, labs, and urinalysis        CTAP +cecal diverticulitis, urinalysis is negative with normal labs

## 2022-07-14 NOTE — ED PROVIDER NOTE - PROGRESS NOTE DETAILS
Please note pt bp at the time of discharge is 217/111 with no symptoms, son at bedside admits that pt did not take her BP meds this morning labetalol and losartan medications given at discharge.

## 2022-07-14 NOTE — ED PROVIDER NOTE - NS ED ROS FT
Review of Systems    Constitutional: (-) fever  Eyes/ENT: (-) change in vision, (-) sore throat, (-) ear pain  Cardiovascular: (-) chest pain, (-) palpitation   Respiratory: (-) cough, (-) shortness of breath  Gastrointestinal: (-) abdominal pain (-) vomiting, (-) diarrhea  Musculoskeletal: (-) neck pain, (-) joint pain  Integumentary: (-) rash, (-) edema  Neurological: (-) headache, (-) altered mental status  Heme/Lymph: (-) easy bruising (-) easy bleeding  Allergic/Immunologic: (-) pruritus

## 2022-07-14 NOTE — ED PROVIDER NOTE - OBJECTIVE STATEMENT
82 yo f pmhx sig for HTN pw gradual onset of L lower back and flank pain on radiating over the last 12 hr in duration, aching mod in severity non radiating w/o n/v. Denies dysuria, urgency and frequency. No falls ro trauma. No LE weakness, no urinary retention or overflow incontinence, no h/o malignancy/tb or ivdu.    I have reviewed available current nursing and previous documentation of past medical, surgical, family, and/or social history.

## 2022-07-14 NOTE — ED PROVIDER NOTE - PATIENT PORTAL LINK FT
You can access the FollowMyHealth Patient Portal offered by [facility] by registering at the following website: [email or web address]. By joining "[employer]’s FollowMyHealth portal, you will also be able to view your health information using other applications (apps) compatible with our system.

## 2022-07-14 NOTE — ED ADULT TRIAGE NOTE - CHIEF COMPLAINT QUOTE
Patient c/o right lower back pain x 2 week that has gotten progressively worse. B/p elevated in triage, didn't take any meds this morning 215/118  hx renal disease, htn Patient c/o right lower back pain x 2 week that has gotten progressively worse. B/p elevated in triage, didn't take any meds this morning 216/118  hx renal disease, htn

## 2022-07-14 NOTE — ED ADULT NURSE NOTE - OBJECTIVE STATEMENT
Patient is alert and oriented x4. Came in for back pain x 1 week, gotten worse today. Pain score is 7/10. Walks with a cane. Denies any trauma or falls. Denies any dizziness, n/v or shortness of breath.

## 2022-07-14 NOTE — ED PROVIDER NOTE - PHYSICAL EXAMINATION
Physical Exam    Vital Signs: I have reviewed the initial vital signs.  Constitutional: well-nourished, appears stated age, no acute distress  Eyes: PERRLA, and symmetrical lids.  ENT: Neck supple with no adenopathy, moist MM.  Cardiovascular: regular rate, regular rhythm, well-perfused extremities  Respiratory: unlabored respiratory effort, clear to auscultation bilaterally  Gastrointestinal: soft, non-tender abdomen, non distended  Musculoskeletal: +R lower back ttp, no spine ttp or step off, full ROM in all [id number]  Integumentary: warm, dry, no rash  Neurologic: extremities’ motor and sensory functions grossly intact  Psychiatric: A&Ox3

## 2022-07-16 LAB
CULTURE RESULTS: SIGNIFICANT CHANGE UP
SPECIMEN SOURCE: SIGNIFICANT CHANGE UP

## 2024-01-30 NOTE — PHYSICAL THERAPY INITIAL EVALUATION ADULT - ASSISTIVE DEVICE FOR TRANSFER: SIT/STAND, REHAB EVAL
Pt arrives ambulatory for Taxol C1D15  Pt reports having episodes of diarrhea. Pt educated to try imodium  OTC and if no improvement to call ofc.  Pt tolerated treatment without incident and discharged in stable condition  Next appt 2/6 MD appt followed by treatment Taxol /trazimera C2D1  
straight cane